# Patient Record
Sex: MALE | Race: WHITE | NOT HISPANIC OR LATINO | Employment: FULL TIME | URBAN - METROPOLITAN AREA
[De-identification: names, ages, dates, MRNs, and addresses within clinical notes are randomized per-mention and may not be internally consistent; named-entity substitution may affect disease eponyms.]

---

## 2020-08-17 RX ORDER — ARGININE HCL 1000 MG
1 TABLET ORAL DAILY
COMMUNITY

## 2020-08-17 RX ORDER — ALFUZOSIN HYDROCHLORIDE 10 MG/1
10 TABLET, EXTENDED RELEASE ORAL DAILY
COMMUNITY

## 2020-08-17 RX ORDER — METOPROLOL SUCCINATE 50 MG/1
50 TABLET, EXTENDED RELEASE ORAL DAILY
COMMUNITY
Start: 2020-03-16

## 2020-08-17 RX ORDER — LORATADINE 10 MG/1
10 TABLET ORAL DAILY
COMMUNITY

## 2020-08-17 RX ORDER — TADALAFIL 5 MG/1
5 TABLET ORAL DAILY
COMMUNITY

## 2020-08-17 RX ORDER — CHOLECALCIFEROL (VITAMIN D3) 50 MCG
1 CAPSULE ORAL DAILY
Status: ON HOLD | COMMUNITY
End: 2020-08-20 | Stop reason: ALTCHOICE

## 2020-08-17 RX ORDER — LISINOPRIL 20 MG/1
20 TABLET ORAL DAILY
COMMUNITY
Start: 2020-07-29

## 2020-08-17 RX ORDER — OMEPRAZOLE 20 MG/1
20 TABLET, DELAYED RELEASE ORAL DAILY
COMMUNITY

## 2020-08-17 RX ORDER — AMLODIPINE BESYLATE 10 MG/1
10 TABLET ORAL DAILY
COMMUNITY
Start: 2020-07-29

## 2020-08-17 NOTE — PRE-PROCEDURE INSTRUCTIONS
Pre-Surgery Instructions:   Medication Instructions    alfuzosin (UROXATRAL) 10 mg 24 hr tablet Instructed patient per Anesthesia Guidelines   amLODIPine (NORVASC) 10 mg tablet Instructed patient per Anesthesia Guidelines   Cholecalciferol (Vitamin D3 Super Strength) 50 MCG (2000 UT) CAPS Instructed patient per Anesthesia Guidelines   L-Arginine 1000 MG TABS Instructed patient per Anesthesia Guidelines   lisinopril (ZESTRIL) 20 mg tablet Instructed patient per Anesthesia Guidelines   loratadine (CLARITIN) 10 mg tablet Instructed patient per Anesthesia Guidelines   metFORMIN (GLUCOPHAGE) 1000 MG tablet Instructed patient per Anesthesia Guidelines   metoprolol succinate (TOPROL-XL) 50 mg 24 hr tablet Instructed patient per Anesthesia Guidelines   Milk Thistle 1000 MG CAPS Instructed patient per Anesthesia Guidelines   omeprazole (PriLOSEC OTC) 20 MG tablet Instructed patient per Anesthesia Guidelines   tadalafil (CIALIS) 5 MG tablet Instructed patient per Anesthesia Guidelines  Instructed to take Amlodipine, Prilosec,and Metoprolol with sip of water the morning of surgery  Instructed on 8/14 no aspirin, NSAIDs vitamins or supplements from 8/14 til after  surgery

## 2020-08-19 ENCOUNTER — ANESTHESIA EVENT (OUTPATIENT)
Dept: PERIOP | Facility: HOSPITAL | Age: 55
End: 2020-08-19
Payer: COMMERCIAL

## 2020-08-20 ENCOUNTER — ANESTHESIA (OUTPATIENT)
Dept: PERIOP | Facility: HOSPITAL | Age: 55
End: 2020-08-20
Payer: COMMERCIAL

## 2020-08-20 ENCOUNTER — HOSPITAL ENCOUNTER (OUTPATIENT)
Facility: HOSPITAL | Age: 55
Setting detail: OUTPATIENT SURGERY
Discharge: HOME | End: 2020-08-20
Attending: UROLOGY | Admitting: UROLOGY
Payer: COMMERCIAL

## 2020-08-20 VITALS
OXYGEN SATURATION: 95 % | SYSTOLIC BLOOD PRESSURE: 144 MMHG | DIASTOLIC BLOOD PRESSURE: 81 MMHG | TEMPERATURE: 98.2 F | RESPIRATION RATE: 18 BRPM | WEIGHT: 255 LBS | HEART RATE: 79 BPM | HEIGHT: 74 IN | BODY MASS INDEX: 32.73 KG/M2

## 2020-08-20 DIAGNOSIS — N13.8 ENLARGED PROSTATE WITH URINARY OBSTRUCTION: Primary | ICD-10-CM

## 2020-08-20 DIAGNOSIS — N40.1 ENLARGED PROSTATE WITH URINARY OBSTRUCTION: Primary | ICD-10-CM

## 2020-08-20 PROBLEM — I10 HTN (HYPERTENSION): Status: ACTIVE | Noted: 2020-08-20

## 2020-08-20 PROBLEM — F17.200 SMOKING: Status: ACTIVE | Noted: 2020-08-20

## 2020-08-20 PROBLEM — K21.9 GASTROESOPHAGEAL REFLUX DISEASE WITHOUT ESOPHAGITIS: Status: ACTIVE | Noted: 2020-08-20

## 2020-08-20 PROBLEM — E78.5 HYPERLIPIDEMIA: Status: ACTIVE | Noted: 2020-08-20

## 2020-08-20 LAB — GLUCOSE SERPL-MCNC: 119 MG/DL (ref 65–140)

## 2020-08-20 PROCEDURE — 82948 REAGENT STRIP/BLOOD GLUCOSE: CPT

## 2020-08-20 PROCEDURE — L8699 PROSTHETIC IMPLANT NOS: HCPCS | Performed by: UROLOGY

## 2020-08-20 DEVICE — IMPLANT URO PROSTATE UROLIFT: Type: IMPLANTABLE DEVICE | Site: BLADDER | Status: FUNCTIONAL

## 2020-08-20 RX ORDER — MIDAZOLAM HYDROCHLORIDE 2 MG/2ML
INJECTION, SOLUTION INTRAMUSCULAR; INTRAVENOUS AS NEEDED
Status: DISCONTINUED | OUTPATIENT
Start: 2020-08-20 | End: 2020-08-20

## 2020-08-20 RX ORDER — PROPOFOL 10 MG/ML
INJECTION, EMULSION INTRAVENOUS AS NEEDED
Status: DISCONTINUED | OUTPATIENT
Start: 2020-08-20 | End: 2020-08-20

## 2020-08-20 RX ORDER — ACETAMINOPHEN 325 MG/1
650 TABLET ORAL EVERY 6 HOURS PRN
Status: DISCONTINUED | OUTPATIENT
Start: 2020-08-20 | End: 2020-08-20 | Stop reason: HOSPADM

## 2020-08-20 RX ORDER — FENTANYL CITRATE 50 UG/ML
INJECTION, SOLUTION INTRAMUSCULAR; INTRAVENOUS AS NEEDED
Status: DISCONTINUED | OUTPATIENT
Start: 2020-08-20 | End: 2020-08-20

## 2020-08-20 RX ORDER — DEXAMETHASONE SODIUM PHOSPHATE 4 MG/ML
INJECTION, SOLUTION INTRA-ARTICULAR; INTRALESIONAL; INTRAMUSCULAR; INTRAVENOUS; SOFT TISSUE AS NEEDED
Status: DISCONTINUED | OUTPATIENT
Start: 2020-08-20 | End: 2020-08-20

## 2020-08-20 RX ORDER — CIPROFLOXACIN 2 MG/ML
400 INJECTION, SOLUTION INTRAVENOUS ONCE
Status: COMPLETED | OUTPATIENT
Start: 2020-08-20 | End: 2020-08-20

## 2020-08-20 RX ORDER — CIPROFLOXACIN 500 MG/1
500 TABLET, FILM COATED ORAL EVERY 12 HOURS SCHEDULED
Qty: 14 TABLET | Refills: 0 | Status: SHIPPED | OUTPATIENT
Start: 2020-08-20 | End: 2020-08-27

## 2020-08-20 RX ORDER — SODIUM CHLORIDE 9 MG/ML
125 INJECTION, SOLUTION INTRAVENOUS CONTINUOUS
Status: DISCONTINUED | OUTPATIENT
Start: 2020-08-20 | End: 2020-08-20 | Stop reason: HOSPADM

## 2020-08-20 RX ORDER — PHENAZOPYRIDINE HYDROCHLORIDE 200 MG/1
200 TABLET, FILM COATED ORAL ONCE
Status: COMPLETED | OUTPATIENT
Start: 2020-08-20 | End: 2020-08-20

## 2020-08-20 RX ORDER — CHLORAL HYDRATE 500 MG
1000 CAPSULE ORAL DAILY
COMMUNITY

## 2020-08-20 RX ORDER — SODIUM CHLORIDE 9 MG/ML
150 INJECTION, SOLUTION INTRAVENOUS CONTINUOUS
Status: DISCONTINUED | OUTPATIENT
Start: 2020-08-20 | End: 2020-08-20 | Stop reason: HOSPADM

## 2020-08-20 RX ORDER — CIPROFLOXACIN 500 MG/1
500 TABLET, FILM COATED ORAL ONCE
Status: DISCONTINUED | OUTPATIENT
Start: 2020-08-20 | End: 2020-08-20 | Stop reason: HOSPADM

## 2020-08-20 RX ORDER — ONDANSETRON 2 MG/ML
4 INJECTION INTRAMUSCULAR; INTRAVENOUS ONCE AS NEEDED
Status: DISCONTINUED | OUTPATIENT
Start: 2020-08-20 | End: 2020-08-20 | Stop reason: HOSPADM

## 2020-08-20 RX ORDER — OXYCODONE HYDROCHLORIDE AND ACETAMINOPHEN 5; 325 MG/1; MG/1
1 TABLET ORAL EVERY 4 HOURS PRN
Status: DISCONTINUED | OUTPATIENT
Start: 2020-08-20 | End: 2020-08-20 | Stop reason: HOSPADM

## 2020-08-20 RX ORDER — ONDANSETRON 2 MG/ML
INJECTION INTRAMUSCULAR; INTRAVENOUS AS NEEDED
Status: DISCONTINUED | OUTPATIENT
Start: 2020-08-20 | End: 2020-08-20

## 2020-08-20 RX ORDER — FENTANYL CITRATE/PF 50 MCG/ML
25 SYRINGE (ML) INJECTION
Status: COMPLETED | OUTPATIENT
Start: 2020-08-20 | End: 2020-08-20

## 2020-08-20 RX ADMIN — SODIUM CHLORIDE 150 ML/HR: 0.9 INJECTION, SOLUTION INTRAVENOUS at 10:37

## 2020-08-20 RX ADMIN — PHENAZOPYRIDINE 200 MG: 200 TABLET ORAL at 13:03

## 2020-08-20 RX ADMIN — SODIUM CHLORIDE: 0.9 INJECTION, SOLUTION INTRAVENOUS at 11:36

## 2020-08-20 RX ADMIN — CIPROFLOXACIN: 2 INJECTION INTRAVENOUS at 10:56

## 2020-08-20 RX ADMIN — DEXAMETHASONE SODIUM PHOSPHATE 4 MG: 4 INJECTION, SOLUTION INTRAMUSCULAR; INTRAVENOUS at 11:06

## 2020-08-20 RX ADMIN — PROPOFOL 200 MG: 10 INJECTION, EMULSION INTRAVENOUS at 11:06

## 2020-08-20 RX ADMIN — FENTANYL CITRATE 100 MCG: 50 INJECTION, SOLUTION INTRAMUSCULAR; INTRAVENOUS at 11:06

## 2020-08-20 RX ADMIN — LIDOCAINE HYDROCHLORIDE 100 MG: 20 INJECTION, SOLUTION INTRAVENOUS at 11:06

## 2020-08-20 RX ADMIN — MIDAZOLAM 2 MG: 1 INJECTION INTRAMUSCULAR; INTRAVENOUS at 11:01

## 2020-08-20 RX ADMIN — FENTANYL CITRATE 25 MCG: 50 INJECTION, SOLUTION INTRAMUSCULAR; INTRAVENOUS at 11:57

## 2020-08-20 RX ADMIN — FENTANYL CITRATE 25 MCG: 50 INJECTION, SOLUTION INTRAMUSCULAR; INTRAVENOUS at 12:23

## 2020-08-20 RX ADMIN — FENTANYL CITRATE 25 MCG: 50 INJECTION, SOLUTION INTRAMUSCULAR; INTRAVENOUS at 12:15

## 2020-08-20 RX ADMIN — ONDANSETRON 4 MG: 2 INJECTION INTRAMUSCULAR; INTRAVENOUS at 11:06

## 2020-08-20 RX ADMIN — FENTANYL CITRATE 25 MCG: 50 INJECTION, SOLUTION INTRAMUSCULAR; INTRAVENOUS at 12:02

## 2020-08-20 NOTE — ANESTHESIA PREPROCEDURE EVALUATION
Procedure:  CYSTOSCOPY WITH INSERTION UROLIFT (N/A Bladder)    Relevant Problems   ANESTHESIA (within normal limits)      CARDIO  h/o PVC s    (+) HTN (hypertension)   (+) Hyperlipidemia      ENDO (within normal limits)      GI/HEPATIC  fatty liver   (+) Gastroesophageal reflux disease without esophagitis (controlled )      /RENAL (within normal limits)      HEMATOLOGY (within normal limits)      MUSCULOSKELETAL (within normal limits)      NEURO/PSYCH (within normal limits)      PULMONARY   (+) Smoking        Physical Exam    Airway    Mallampati score: II  TM Distance: >3 FB  Neck ROM: full     Dental   No notable dental hx     Cardiovascular  Rhythm: regular, Rate: normal, Cardiovascular exam normal    Pulmonary  Pulmonary exam normal Breath sounds clear to auscultation,     Other Findings        Anesthesia Plan  ASA Score- 2     Anesthesia Type- general with ASA Monitors  Additional Monitors:   Airway Plan: LMA  Plan Factors-    Chart reviewed  Patient is not a current smoker  Patient smoked on day of surgery  Induction- intravenous  Postoperative Plan-     Informed Consent- Anesthetic plan and risks discussed with patient

## 2020-08-20 NOTE — INTERVAL H&P NOTE
H&P reviewed  After examining the patient I find no changes in the patients condition since the H&P had been written      Vitals:    08/20/20 1040   BP: 146/95   Pulse: 84   Resp: 18   Temp: 98 1 °F (36 7 °C)   SpO2: 94%

## 2020-08-20 NOTE — OP NOTE
OPERATIVE REPORT    PATIENT NAME: Mylene Arora    :  1965  MRN: [id number]  Pt Location: [facility] OR ROOM 02    SURGERY DATE:   2020  Surgeon(s) and Role:      Demetria Seek, DO - Primary    Preop Diagnosis:  Benign prostatic hyperplasia with lower urinary tract symptoms [N40 1]  Post-Op Diagnosis Codes:   Benign prostatic hyperplasia with lower urinary tract symptoms [N40 1]  Procedure(s):  CYSTOSCOPY WITH INSERTION UROLIFT, (4 implants were placed in total)    Specimen(s):  None    Estimated Blood Loss:   Minimal    Drains:  Urethral Catheter Latex 20 Fr  (Active)   Number of days: 0       Anesthesia Type:   General    Operative Indications:  Multiple obstructive and irritative urinary symptoms refractory to treatment with alpha blockers and PDE 5 inhibitors    Operative Findings:  Enlarged obstructing lateral lobes of the prostate  Trabeculated urinary bladder    Complications:   None known    Procedure and Technique:  Patient was identified  General anesthesia was administered  Patient was placed in dorsal lithotomy position  Genitals were prepped and draped  Time-out was taken  Rigid cystoscopy was performed and revealed trabeculated bladder with thickened bladder wall  Cystoscopy also revealed bilateral lateral lobe enlargement of the prostate causing bladder outlet obstruction   was removed from the cystoscope and replaced with a Urolift implant delivery device    First implant was placed 2 cm distal to the bladder neck on the left  Second implant was placed 2 cm distal to the bladder neck on the right  Third implant was placed at the level of the verumontanum on the left  Fourth and final implant was placed at the level of the verumontanum on the right  Visualization of the prostatic urethra was then done and revealed an excellent continuous anterior prostatic urethral channel to allow for unobstructed voiding  No significant bleeding was noted  Procedure was terminated at this point in time  [de-identified] [facility] Croft catheter was placed to gravity drainage and attached to a leg bag  Patient went to recovery room in good postoperative condition having tolerated the procedure well  His to follow-up at the office tomorrow for Croft catheter removal       I was present for the entire procedure    Patient Disposition:  PACU     SIGNATURE: Valentino Gold,   DATE: August 20, 2020  TIME: 11:52 AM

## 2020-08-20 NOTE — ANESTHESIA POSTPROCEDURE EVALUATION
Post-Op Assessment Note    CV Status:  Stable  Pain Score: 1    Pain management: adequate     Mental Status:  Alert and awake   Hydration Status:  Euvolemic   PONV Controlled:  Controlled   Airway Patency:  Patent      Post Op Vitals Reviewed: Yes      Staff: Anesthesiologist         No complications documented      /82 (08/20/20 1230)    Temp 98 2 °F (36 8 °C) (08/20/20 1230)    Pulse 78 (08/20/20 1230)   Resp 14 (08/20/20 1230)    SpO2 93 % (08/20/20 1230)

## 2020-08-20 NOTE — DISCHARGE INSTRUCTIONS
Urinary Leg Bag   WHAT YOU NEED TO KNOW:   What is a urinary leg bag? A urinary leg bag holds urine that drains from your catheter  It fits under your clothes and allows you to do your normal daily activities  How do I use a urinary leg bag? · Wash your hands  before and after you touch your catheter, tubing, or drainage bag  Use soap and water  This reduces the risk of infection  · Strap your leg bag to your thigh or calf  Make sure the straps are comfortable  The straps can cause problems with blood flow in your leg if they are too tight  · Clean the tip of the drainage tube with alcohol  before attaching it to your catheter  This helps prevent bacteria from getting into your catheter  · The connecting tube should not pull on your catheter  Skin breakdown can occur if there is constant pulling on the catheter  · Check the tube often to make sure it is not kinked or twisted  Blockage in the tube can cause urine to back up into your bladder  Your urine must flow straight through the tube into your leg bag  · Always keep the leg bag below your bladder  This prevents urine from the bag going back into your bladder, which may cause an infection  · Empty your leg bag when it is ½ full, or every 3 hours  A full bag may break or disconnect from the catheter  · Change to your bedside bag before you go to bed  Your bedside bag can hold more urine  Do not use your leg bag at night because it could become too full or break  · Clean your leg bag after every use  Fill the bag with 2 parts vinegar and 3 parts water  Let it soak for 20 minutes, then rinse and let dry  Follow your healthcare provider's instruction on replacing your leg bag with a new one  CARE AGREEMENT:   You have the right to help plan your care  Learn about your health condition and how it may be treated  Discuss treatment options with your caregivers to decide what care you want to receive   You always have the right to refuse treatment  The above information is an  only  It is not intended as medical advice for individual conditions or treatments  Talk to your doctor, nurse or pharmacist before following any medical regimen to see if it is safe and effective for you  © 2017 [employer] Information is for End User's use only and may not be sold, redistributed or otherwise used for commercial purposes  All illustrations and images included in CareNotes® are the copyrighted property of A [employer]  or [employer]  Croft Catheter Placement and Care   WHAT YOU NEED TO KNOW:   A Croft catheter is a sterile tube that is inserted into your bladder to drain urine  It is also called an indwelling urinary catheter  The tip of the catheter has a small balloon filled with solution that holds the catheter inside your bladder  DISCHARGE INSTRUCTIONS:   Seek care immediately if:   · Your catheter comes out  · You suddenly have material that looks like sand in the tubing or drainage bag  · No urine is draining into the bag and you have checked the system  · You have pain in your hip, back, pelvis, or lower abdomen  · You are confused or cannot think clearly  Contact your healthcare provider if:   · You have a fever  · You have bladder spasms for more than 1 day after the catheter is placed  · You see blood in the tubing or drainage bag  · You have a rash or itching where the catheter tube is secured to your skin  · Urine leaks from or around the catheter, tubing, or drainage bag  · The closed drainage system has accidently come open or apart  · You see a layer of crystals inside the tubing  · You have questions or concerns about your condition or care  Care for your Croft catheter:   · Clean your genital area 2 times every day  Clean your catheter and the area around where it was inserted  Use soap and water   Clean your anal opening and catheter area after every bowel movement  · Secure the catheter tube  so you do not pull or move the catheter  This helps prevent pain and bladder spasms  Healthcare providers will show you how to use medical tape or a strap to secure the catheter tube to your body  · Keep a closed drainage system  Your Croft catheter should always be attached to the drainage bag to form a closed system  Do not disconnect any part of the closed system unless you need to change the bag  Care for your drainage bag:   · Ask if a leg bag is right for you  A leg bag can be worn under your clothes  Ask your healthcare provider for more information about a leg bag  · Keep the drainage bag below the level of your waist   This helps stop urine from moving back up the tubing and into your bladder  Do not loop or kink the tubing  This can cause urine to back up and collect in your bladder  Do not let the drainage bag touch or lie on the floor  · Empty the drainage bag when needed  The weight of a full drainage bag can be painful  Empty the drainage bag every 3 to 6 hours or when it is ? full  · Clean and change the drainage bag as directed  Ask your healthcare provider how often you should change the drainage bag and what cleaning solution to use  Wear disposable gloves when you change the bag  Do not allow the end of the catheter or tubing to touch anything  Clean the ends with an alcohol pad before you reconnect them  What to do if problems develop:   · No urine is draining into the bag:      ¨ Check for kinks in the tubing and straighten them out  ¨ Check the tape or strap used to secure the catheter tube to your skin  Make sure it is not blocking the tube  ¨ Make sure you are not sitting or lying on the tubing      ¨ Make sure the urine bag is hanging below the level of your waist     · Urine leaks from or around the catheter, tubing, or drainage bag:  Check if the closed drainage system has accidently come open or apart  Clean the catheter and tubing ends with a new alcohol pad and reconnect them  Prevent an infection:   · Wash your hands often  Wash before and after you touch your catheter, tubing, or drainage bag  Use soap and water  Wear clean disposable gloves when you care for your catheter or disconnect the drainage bag  Wash your hands before you prepare or eat food  · Drink liquids as directed  Ask your healthcare provider how much liquid to drink each day and which liquids are best for you  Liquids will help flush your kidneys and bladder to help prevent infection  Follow up with your healthcare provider as directed:  Write down your questions so you remember to ask them during your visits  © 2017 [facility] [facility] Information is for End User's use only and may not be sold, redistributed or otherwise used for commercial purposes  All illustrations and images included in CareNotes® are the copyrighted property of [employer]  or [employer]  The above information is an  only  It is not intended as medical advice for individual conditions or treatments  Talk to your doctor, nurse or pharmacist before following any medical regimen to see if it is safe and effective for you

## 2021-01-05 ENCOUNTER — IMMUNIZATIONS (OUTPATIENT)
Dept: FAMILY MEDICINE CLINIC | Facility: HOSPITAL | Age: 56
End: 2021-01-05

## 2021-01-05 DIAGNOSIS — Z23 ENCOUNTER FOR IMMUNIZATION: ICD-10-CM

## 2021-01-05 PROCEDURE — 91301 SARS-COV-2 / COVID-19 MRNA VACCINE (MODERNA) 100 MCG: CPT

## 2021-01-05 PROCEDURE — 0011A SARS-COV-2 / COVID-19 MRNA VACCINE (MODERNA) 100 MCG: CPT

## 2021-02-08 ENCOUNTER — IMMUNIZATIONS (OUTPATIENT)
Dept: FAMILY MEDICINE CLINIC | Facility: HOSPITAL | Age: 56
End: 2021-02-08

## 2021-02-08 DIAGNOSIS — Z23 ENCOUNTER FOR IMMUNIZATION: Primary | ICD-10-CM

## 2021-02-08 PROCEDURE — 91301 SARS-COV-2 / COVID-19 MRNA VACCINE (MODERNA) 100 MCG: CPT

## 2021-02-08 PROCEDURE — 0012A SARS-COV-2 / COVID-19 MRNA VACCINE (MODERNA) 100 MCG: CPT

## 2022-06-01 ENCOUNTER — EVALUATION (OUTPATIENT)
Dept: OCCUPATIONAL THERAPY | Facility: CLINIC | Age: 57
End: 2022-06-01
Payer: OTHER MISCELLANEOUS

## 2022-06-01 DIAGNOSIS — M25.531 RIGHT WRIST PAIN: Primary | ICD-10-CM

## 2022-06-01 PROCEDURE — 97165 OT EVAL LOW COMPLEX 30 MIN: CPT

## 2022-06-01 NOTE — PROGRESS NOTES
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  Patient symptoms including wrist pain during movement, decreased ROM, and decreased fine motor control  Patient symptoms began on 2022  Patient initially went to walk in clinic on 2022  Patient had x-rays a few days following injury and an MRI on 2022  X-ray was abnormal and displayed a bone spur  Patient is awaiting MRI results  Patient has a follow up visit on 2022  Patient was provided a pre-fabricated splint by physician and educated on wear schedule  Patient reports compliance to wear schedule  Patient is ambidextrous  Patient currently is employed as a  and with [employer]  Patient is unable to work currently due to injury  Patient is having difficulty completing any task that involves grasping and pinching such as using a zipper and buttoning     Impairments: abnormal coordination, abnormal or restricted ROM, activity intolerance, impaired physical strength and pain with function  Understanding of Dx/Px/POC: good   Prognosis: good    Goals  STGs:    Patient will increase ROM in wrist by 10-20 degrees- Not Met    Patient will report decreased wrist pain during functional movement- Not Met    Patient will demonstrate an understanding of HEP by end of session- Met    LTGs:    Patient will display wrist ROM WFL- Not Met    Patient will report resolution of pain symptoms during functional movement- Not Met    Patient will report ability to perform all ADLs without symptoms- Not Met    Patient will return to work and be able to perform all work related tasks- Not Met    Plan  Plan details: Patient presenting to OP OT services due to a diagnosis of R wrist pain  Patient symptoms include wrist pain, decreased ROM, and decreased fine motor control  Patient would benefit from skilled occupational therapy services 2 times per week for 4 weeks to return to prior level of function and achieve all of their established goals  Thank you for the referral    Patient would benefit from: custom splinting, skilled occupational therapy and OT eval  Referral necessary: Yes  Planned modality interventions: ultrasound, thermotherapy: hydrocollator packs, unattended electrical stimulation and TENS  Planned therapy interventions: IADL retraining, patient education, self care, manual therapy, orthotic fitting/training, strengthening, stretching, therapeutic activities, therapeutic exercise, home exercise program, graded exercise, graded activity, functional ROM exercises and fine motor coordination training  Frequency: 2x week  Duration in visits: 8  Duration in weeks: 4  Treatment plan discussed with: patient        Subjective Evaluation    History of Present Illness  Date of onset: 5/3/2022  Mechanism of injury: trauma  Mechanism of injury: 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          Recurrent probem    Quality of life: good    Pain  Current pain ratin  At best pain ratin  At worst pain ratin  Location: R wrist  Quality: knife-like, sharp and tight  Relieving factors: ice and relaxation  Aggravating factors: lifting  Progression: no change    Social Support    Employment status: working  Hand dominance: ambidextrous      Diagnostic Tests  X-ray: abnormal  Abnormal MRI: Waiting results  FCE comments: Patient had an x-ray that displayed a bone spur  Patient had an MRI that is awaiting results   Treatments  Previous treatment: immobilization  Current treatment: occupational therapy  Patient Goals  Patient goals for therapy: decreased edema, decreased pain, increased motion, increased strength, independence with ADLs/IADLs and return to work          Objective     Active Range of Motion     Left Wrist   Normal active range of motion    Right Wrist   Wrist flexion: 115 degrees   Wrist extension: 62 degrees   Radial deviation: 20 degrees with pain  Ulnar deviation: 45 degrees     Additional Active Range of Motion Details  Patient displays pain during radial deviation and flexion  Strength/Myotome Testing     Left Wrist/Hand      (2nd hand position)     Trial 1: 100    Thumb Strength  Key/Lateral Pinch     Trial 1: 30  Tip/Two-Point Pinch     Trial 1: 20  Palmar/Three-Point Pinch     Trial 1: 19    Right Wrist/Hand      (2nd hand position)     Trial 1: 100    Thumb Strength   Key/Lateral Pinch     Trial 1: 34  Tip/Two-Point Pinch     Trial 1: 22  Palmar/Three-Point Pinch     Trial 1: 22    Additional Strength Details  Patient didn't experience pain during  strength assessments  Tests     Right Wrist/Hand   Positive Santo's  Additional Tests Details  Patient displayed positive symptoms during Santo's test   Patient had a negative extensor synergy test     Swelling     Left Wrist/Hand   Circumference wrist: 20 cm    Right Wrist/Hand   Circumference wrist: 19 cm  Neuro Exam:     Functional outcomes   Left 9 peg hole test: 19 51 (seconds)  Right 9 hole peg test: 19 53 (seconds)    Patient provided HEP and educated how to properly complete  Patient receptive and agreeable to education  Patient presented with a pre-fabricated splint provided by physician  Patient reported understanding and compliance to wear schedule  Patient educated on ice/heat to decrease symptoms           Initial Evaluation completed on: 6/01/2022  Re-Evaluation needs to be completed before: 7/01/2022  Insurance: A  FOTO: 6/01/2022  Auth Visits: N/A       Precautions: R wrist pain       Manuals 6/01/2022       IASTM        Manual Stretch Wrist all planes Neuro Re-Ed  6/01/2022                                                               Ther Ex 6/01/2022       Wrist AROM Extension Radial/Ulnar Deviation HEP       Dart Throw ROM HEP       Thumb AROM all planes HEP       Rubber Band Digit Extension        Digiflex        Hand Power Pro        Single Tension Pin Thumb                        Ther Activity 6/01/2022                                               Modalities 6/01/2022        10'       Ultrasound Performed dorsal wrist

## 2022-06-01 NOTE — LETTER
Pat 10, 2022    [address]  [address] [address]  [address] [facility] [address]    Patient: nEzo Wilkins   YOB: 1965   Date of Visit: 2022     Encounter Diagnosis     ICD-10-CM    1  Right wrist pain  M25 531        Dear Dr Luis Alberto Benjamin: Thank you for your recent referral of Enzo Wilkins  Please review the attached evaluation summary from Popeye's recent visit  Please verify that you agree with the plan of care by signing the attached order  If you have any questions or concerns, please do not hesitate to call  I sincerely appreciate the opportunity to share in the care of one of your patients and hope to have another opportunity to work with you in the near future  Sincerely,    Radha Nunez      Referring Provider:     I certify that I have read the below Plan of Care and certify the need for these services furnished under this plan of treatment while under my care  Ginna Azar MD  [address]  [address] [address]  [address] [id number]  Via Fax: [phone]        OT Evaluation     Today's date: 2022  Patient name: Enzo Wilkins  : 1965  MRN: [id number]  Referring provider: Gris Ortega MD  Dx:   Encounter Diagnosis     ICD-10-CM    1  Right wrist pain  M25 531        Start Time: 0800  Stop Time: 0900  Total time in clinic (min): 60 minutes    Assessment  Assessment details: Patient presents with a diagnosis of R wrist pain  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  Patient symptoms including wrist pain during movement, decreased ROM, and decreased fine motor control  Patient symptoms began on 2022  Patient initially went to walk in clinic on 2022  Patient had x-rays a few days following injury and an MRI on 2022  X-ray was abnormal and displayed a bone spur  Patient is awaiting MRI results  Patient has a follow up visit on 2022   Patient was provided a pre-fabricated splint by physician and educated on wear schedule  Patient reports compliance to wear schedule  Patient is ambidextrous  Patient currently is employed as a  and with [employer]  Patient is unable to work currently due to injury  Patient is having difficulty completing any task that involves grasping and pinching such as using a zipper and buttoning  Impairments: abnormal coordination, abnormal or restricted ROM, activity intolerance, impaired physical strength and pain with function  Understanding of Dx/Px/POC: good   Prognosis: good    Goals  STGs:    Patient will increase ROM in wrist by 10-20 degrees- Not Met    Patient will report decreased wrist pain during functional movement- Not Met    Patient will demonstrate an understanding of HEP by end of session- Met    LTGs:    Patient will display wrist ROM WFL- Not Met    Patient will report resolution of pain symptoms during functional movement- Not Met    Patient will report ability to perform all ADLs without symptoms- Not Met    Patient will return to work and be able to perform all work related tasks- Not Met    Plan  Plan details: Patient presenting to OP OT services due to a diagnosis of R wrist pain  Patient symptoms include wrist pain, decreased ROM, and decreased fine motor control  Patient would benefit from skilled occupational therapy services 2 times per week for 4 weeks to return to prior level of function and achieve all of their established goals   Thank you for the referral    Patient would benefit from: custom splinting, skilled occupational therapy and OT eval  Referral necessary: Yes  Planned modality interventions: ultrasound, thermotherapy: hydrocollator packs, unattended electrical stimulation and TENS  Planned therapy interventions: IADL retraining, patient education, self care, manual therapy, orthotic fitting/training, strengthening, stretching, therapeutic activities, therapeutic exercise, home exercise program, graded exercise, graded activity, functional ROM exercises and fine motor coordination training  Frequency: 2x week  Duration in visits: 8  Duration in weeks: 4  Treatment plan discussed with: patient        Subjective Evaluation    History of Present Illness  Date of onset: 5/3/2022  Mechanism of injury: trauma  Mechanism of injury: 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          Recurrent probem    Quality of life: good    Pain  Current pain ratin  At best pain ratin  At worst pain ratin  Location: R wrist  Quality: knife-like, sharp and tight  Relieving factors: ice and relaxation  Aggravating factors: lifting  Progression: no change    Social Support    Employment status: working  Hand dominance: ambidextrous      Diagnostic Tests  X-ray: abnormal  Abnormal MRI: Waiting results  FCE comments: Patient had an x-ray that displayed a bone spur  Patient had an MRI that is awaiting results  Treatments  Previous treatment: immobilization  Current treatment: occupational therapy  Patient Goals  Patient goals for therapy: decreased edema, decreased pain, increased motion, increased strength, independence with ADLs/IADLs and return to work          Objective     Active Range of Motion     Left Wrist   Normal active range of motion    Right Wrist   Wrist flexion: 115 degrees   Wrist extension: 62 degrees   Radial deviation: 20 degrees with pain  Ulnar deviation: 45 degrees     Additional Active Range of Motion Details  Patient displays pain during radial deviation and flexion      Strength/Myotome Testing     Left Wrist/Hand      (2nd hand position)     Trial 1: 100    Thumb Strength  Key/Lateral Pinch     Trial 1: 30  Tip/Two-Point Pinch     Trial 1: 20  Palmar/Three-Point Pinch     Trial 1: 19    Right Wrist/Hand      (2nd hand position)     Trial 1: 100    Thumb Strength   Key/Lateral Pinch     Trial 1: 34  Tip/Two-Point Pinch     Trial 1: 22  Palmar/Three-Point Pinch     Trial 1: 22    Additional Strength Details  Patient didn't experience pain during  strength assessments  Tests     Right Wrist/Hand   Positive Santo's  Additional Tests Details  Patient displayed positive symptoms during Santo's test   Patient had a negative extensor synergy test     Swelling     Left Wrist/Hand   Circumference wrist: 20 cm    Right Wrist/Hand   Circumference wrist: 19 cm  Neuro Exam:     Functional outcomes   Left 9 peg hole test: 19 51 (seconds)  Right 9 hole peg test: 19 53 (seconds)    Patient provided HEP and educated how to properly complete  Patient receptive and agreeable to education  Patient presented with a pre-fabricated splint provided by physician  Patient reported understanding and compliance to wear schedule  Patient educated on ice/heat to decrease symptoms  Initial Evaluation completed on: 6/01/2022  Re-Evaluation needs to be completed before: 7/01/2022  Insurance: AMA  FOTO: 6/01/2022  Auth Visits: N/A       Precautions: R wrist pain       Manuals 6/01/2022       IASTM        Manual Stretch Wrist all planes                        Neuro Re-Ed  6/01/2022                                                               Ther Ex 6/01/2022       Wrist AROM Extension Radial/Ulnar Deviation HEP       Dart Throw ROM HEP       Thumb AROM all planes HEP       Rubber Band Digit Extension        Digiflex        Hand Power Pro        Single Tension Pin Thumb                        Ther Activity 6/01/2022                                               Modalities 6/01/2022        10'       Ultrasound Performed dorsal wrist                 Attestation signed by Rusty Powell OT at 6/1/2022 11:45 AM:  I supervised the visit  We discussed the case to ensure appropriate continuation and progression of care and I reviewed the documentation

## 2022-06-06 NOTE — PROGRESS NOTES
Daily Note     Today's date: 2022  Patient name: Jed Lama  : 1965  MRN: [id number]  Referring provider: Kayla Edwards MD  Dx:   Encounter Diagnosis     ICD-10-CM    1  Right wrist pain  M25 531                   Subjective: I tried opening a bottle for my daughter and I got a lot of pain  Objective: See treatment diary below      Assessment: Tolerated treatment well  Patient treatment primarily focused on manual massage and stretching, as well as modalities  Pain primarily around ulnar styloid, radiating across dorsal wrist crease  Trialed KT tape for pain and swelling  Pt verbalized being unable to weight bear through R wrist secondary increase pain with same  Pain depends upon certain movement of what he is doing  Plan: Continue per plan of care  Progress treatment as tolerated  Initial Evaluation completed on: 2022  Re-Evaluation needs to be completed before: 2022  Insurance: [place]  FOTO: 2022  Auth Visits: N/A       Precautions: R wrist pain       Manuals 2022      IASTM        Manual Stretch Wrist all planes  performed      KT tape  Ulnar side      Manual massage  performed      Neuro Re-Ed  2022                                                              Ther Ex 2022      Wrist AROM Extension Radial/Ulnar Deviation HEP       Dart Throw ROM HEP       Thumb AROM all planes HEP       Rubber Band Digit Extension        Digiflex        Hand Power Pro        Single Tension Pin Thumb        isometrics  X 5 all wrist directions              Ther Activity 2022                                              Modalities 2022      MH 10' CP/stim performed post tx      Ultrasound Performed dorsal wrist 0 8 intensity  3  3MHz  50% rate

## 2022-06-07 ENCOUNTER — OFFICE VISIT (OUTPATIENT)
Dept: OCCUPATIONAL THERAPY | Facility: CLINIC | Age: 57
End: 2022-06-07
Payer: OTHER MISCELLANEOUS

## 2022-06-07 DIAGNOSIS — M25.531 RIGHT WRIST PAIN: Primary | ICD-10-CM

## 2022-06-07 PROCEDURE — 97140 MANUAL THERAPY 1/> REGIONS: CPT

## 2022-06-07 PROCEDURE — 97014 ELECTRIC STIMULATION THERAPY: CPT

## 2022-06-07 PROCEDURE — 97035 APP MDLTY 1+ULTRASOUND EA 15: CPT

## 2022-06-09 ENCOUNTER — OFFICE VISIT (OUTPATIENT)
Dept: OCCUPATIONAL THERAPY | Facility: CLINIC | Age: 57
End: 2022-06-09
Payer: OTHER MISCELLANEOUS

## 2022-06-09 DIAGNOSIS — M25.531 RIGHT WRIST PAIN: Primary | ICD-10-CM

## 2022-06-09 PROCEDURE — 97110 THERAPEUTIC EXERCISES: CPT

## 2022-06-09 PROCEDURE — 97140 MANUAL THERAPY 1/> REGIONS: CPT

## 2022-06-09 NOTE — PROGRESS NOTES
Daily Note     Today's date: 2022  Patient name: Marybeth Barker  : 1965  MRN: [id number]  Referring provider: Isabel Garcia MD  Dx:   Encounter Diagnosis     ICD-10-CM    1  Right wrist pain  M25 531                   Subjective: "It doesn't hurt all the time, sometimes it just catches and hurts"  "It especially hurts when I lift up from a chair or turn my wrist to place my phone in my pocket"  Objective: See treatment diary below      Assessment: Tolerated treatment well  Patient exhibited good technique with therapeutic exercises and would benefit from continued OT  Patient had MRI prior to session  MRI displayed evidence of inflammation in the ECU and slight inflammation in the carpal ligaments  MRI displayed no sign of fracture  Patient reported no pain symptoms during typical movement but during certain tasks he has significantly increased pain  Patient completed therapeutic exercises and activities on this date, tolerating them well  Therapist performed special tests to determine potential mechanism of pain  Patient is positive for screwdriver test and TFCC load test  Therapist performed E-Stim, ultrasound, and IASTM to decrease edema and pain  Plan: Continue per plan of care  Progress treatment as tolerated         Initial Evaluation completed on: 2022  Re-Evaluation needs to be completed before: 2022  Insurance: AMA  FOTO: 2022  Auth Visits: N/A       Precautions: R wrist pain       Manuals 2022     IASTM   Performed dorsal and volar wrist     Manual Stretch Wrist all planes  performed Performed     KT tape  Ulnar side      Manual massage  performed Peformed     Neuro Re-Ed  2022                                                             Ther Ex 2022     Wrist AROM Extension Radial/Ulnar Deviation HEP       Dart Throw ROM HEP       Thumb AROM all planes HEP       Wrist Maze   X 3 Rubber Band Digit Extension        Digiflex   Green  X 20     Hand Power Pro   Green x 20     Single Tension Pin Thumb        isometrics  X 5 all wrist directions X 5 all wrist directions     Flex Bar Twists  (flex, twist, bottle cap)   X 20  X 20  X 20     Ther Activity 6/01/2022 6/7/2022 6/09/2022                                             Modalities 6/01/2022 6/7/2022 6/09/2022      10' CP/stim performed post tx  w/ E-Stim performed post tx     E-STIM   Performed w/      Ultrasound Performed dorsal wrist 0 8 intensity  3  3MHz  50% rate   8 intensity 3 3 MHz 50% rate

## 2022-06-14 ENCOUNTER — OFFICE VISIT (OUTPATIENT)
Dept: OCCUPATIONAL THERAPY | Facility: CLINIC | Age: 57
End: 2022-06-14
Payer: OTHER MISCELLANEOUS

## 2022-06-14 DIAGNOSIS — M25.531 RIGHT WRIST PAIN: Primary | ICD-10-CM

## 2022-06-14 PROCEDURE — 97014 ELECTRIC STIMULATION THERAPY: CPT

## 2022-06-14 PROCEDURE — 97140 MANUAL THERAPY 1/> REGIONS: CPT

## 2022-06-14 PROCEDURE — 97110 THERAPEUTIC EXERCISES: CPT

## 2022-06-14 PROCEDURE — 97035 APP MDLTY 1+ULTRASOUND EA 15: CPT

## 2022-06-14 NOTE — PROGRESS NOTES
Daily Note     Today's date: 2022  Patient name: Toro Hendrickson  : 1965  MRN: [id number]  Referring provider: Chadd Mayen MD  Dx:   Encounter Diagnosis     ICD-10-CM    1  Right wrist pain  M25 531                   Subjective: "It is about the same "      Objective: See treatment diary below      Assessment: Tolerated treatment well  Patient exhibited good technique with therapeutic exercises and would benefit from continued OT  Patient has follow-up with MD tomorrow  Therapist trialed cupping this date to address localized pain and swelling  Patient presents with wrist brace this date  Patient continues to reports pain with weight bearing and complex wrist movements  Plan: Continue per plan of care  Progress treatment as tolerated         Initial Evaluation completed on: 2022  Re-Evaluation needs to be completed before: 2022  Insurance: [place]  FOTO: 2022  Auth Visits: N/A       Precautions: R wrist pain       Manuals 2022    IASTM   Performed dorsal and volar wrist Performed dorsal and volar wrist    Manual Stretch Wrist all planes  performed Performed Performed    KT tape  Ulnar side      Manual massage  performed Peformed     Cupping    3' Dorsal Wrist            Neuro Re-Ed  2022                                                            Ther Ex 2022    Wrist AROM Extension Radial/Ulnar Deviation HEP       Dart Throw ROM HEP       Thumb AROM all planes HEP       Wrist Maze   X 3 X 3    Rubber Band Digit Extension        Digiflex   Green  X 20 Blue  X 20    Hand Power Pro   Green x 20 Green x 20    Single Tension Pin Thumb        isometrics  X 5 all wrist directions X 5 all wrist directions X 5 all wrist directions    Flex Bar Twists  (flex, twist, bottle cap)   X 20  X 20  X 20 X 20  X 20  X 20            Ther Activity 2022 Modalities 6/01/2022 6/7/2022 6/09/2022 06/14/2022     10' CP/stim performed post tx  w/ E-Stim performed post tx  w/ E-Stim performed post tx    E-STIM   Performed w/  Performed w/     Ultrasound Performed dorsal wrist 0 8 intensity  3  3MHz  50% rate   8 intensity 3 3 MHz 50% rate   8 intensity 3 3 MHz 50% rate

## 2022-06-16 ENCOUNTER — OFFICE VISIT (OUTPATIENT)
Dept: OCCUPATIONAL THERAPY | Facility: CLINIC | Age: 57
End: 2022-06-16
Payer: OTHER MISCELLANEOUS

## 2022-06-16 DIAGNOSIS — M25.531 RIGHT WRIST PAIN: Primary | ICD-10-CM

## 2022-06-16 PROCEDURE — 97035 APP MDLTY 1+ULTRASOUND EA 15: CPT

## 2022-06-16 PROCEDURE — 97140 MANUAL THERAPY 1/> REGIONS: CPT

## 2022-06-16 PROCEDURE — 97110 THERAPEUTIC EXERCISES: CPT

## 2022-06-16 PROCEDURE — 97014 ELECTRIC STIMULATION THERAPY: CPT

## 2022-06-16 NOTE — PROGRESS NOTES
Daily Note     Today's date: 2022  Patient name: Violette Pearza  : 1965  MRN: [id number]  Referring provider: Judi Guillory MD  Dx:   Encounter Diagnosis     ICD-10-CM    1  Right wrist pain  M25 531                   Subjective: "I went back yesterday "      Objective: See treatment diary below      Assessment: Tolerated treatment well  Patient exhibited good technique with therapeutic exercises and would benefit from continued OT  Patient tolerated cupping this date with increased response to treatment as compared to last session  Patient had follow-up with MD yesterday with a referral to hand surgeon at [facility]  Patient has not set-up an appointment at this time  The results from his MRI were discussed at visit  Plan: Continue per plan of care  Progress treatment as tolerated         Initial Evaluation completed on: 2022  Re-Evaluation needs to be completed before: 2022  Insurance: A  FOTO: 2022  Auth Visits: N/A       Precautions: R wrist pain       Manuals 2022   IASTM   Performed dorsal and volar wrist Performed dorsal and volar wrist Performed dorsal and volar wrist   Manual Stretch Wrist all planes  performed Performed Performed Performed   KT tape  Ulnar side      Manual massage  performed Peformed     Cupping    3' Dorsal Wrist 3' Dorsal Wrist           Neuro Re-Ed  2022                                                           Ther Ex 2022   Wrist AROM Extension Radial/Ulnar Deviation HEP    2#  X 20  X 20  X 20   Dart Throw ROM HEP       Thumb AROM all planes HEP       Wrist Maze   X 3 X 3 X 3   Rubber Band Digit Extension        Digiflex   Green  X 20 Blue  X 20 Blue  X 20   Hand Power Pro   Green x 20 Green x 20 Green x 20   Single Tension Pin Thumb        isometrics  X 5 all wrist directions X 5 all wrist directions     Flex Bar Twists  (flex, twist, bottle cap)   Yellow  X 20  X 20  X 20 Yellow  X 20  X 20  X 20 Yellow  X 20  X 20  X 20           Ther Activity 6/01/2022 6/7/2022 6/09/2022 06/14/2022 06/16/2022                                           Modalities 6/01/2022 6/7/2022 6/09/2022 06/14/2022 06/16/2022    10' CP/stim performed post tx  w/ E-Stim performed post tx  w/ E-Stim performed post tx  w/ E-Stim performed post tx   E-STIM   Performed w/  Performed w/  Performed w/    Ultrasound Performed dorsal wrist 0 8 intensity  3  3MHz  50% rate   8 intensity 3 3 MHz 50% rate   8 intensity 3 3 MHz 50% rate   8 intensity 3 3 MHz 50% rate

## 2022-06-21 ENCOUNTER — OFFICE VISIT (OUTPATIENT)
Dept: OCCUPATIONAL THERAPY | Facility: CLINIC | Age: 57
End: 2022-06-21
Payer: OTHER MISCELLANEOUS

## 2022-06-21 DIAGNOSIS — M25.531 RIGHT WRIST PAIN: Primary | ICD-10-CM

## 2022-06-21 PROCEDURE — 97035 APP MDLTY 1+ULTRASOUND EA 15: CPT

## 2022-06-21 PROCEDURE — 97014 ELECTRIC STIMULATION THERAPY: CPT

## 2022-06-21 PROCEDURE — 97110 THERAPEUTIC EXERCISES: CPT

## 2022-06-21 PROCEDURE — 97140 MANUAL THERAPY 1/> REGIONS: CPT

## 2022-06-21 NOTE — PROGRESS NOTES
Daily Note     Today's date: 2022  Patient name: Mariana Parker  : 1965  MRN: [id number]  Referring provider: Jocelyn Gonsales MD  Dx:   Encounter Diagnosis     ICD-10-CM    1  Right wrist pain  M25 531                   Subjective: "It was feeling pretty good "      Objective: See treatment diary below      Assessment: Tolerated treatment well  Patient exhibited good technique with therapeutic exercises and would benefit from continued OT  Patient tolerated session well with additional reps completed without cueing  Patient reports feeling better since last session till he needed to dump out a trash can yesterday  Patient reports a relief of symptoms at the end of session  Plan: Continue per plan of care  Progress treatment as tolerated  Initial Evaluation completed on: 2022  Re-Evaluation needs to be completed before: 2022  Insurance: [place] [employer]  FOTO: 2022  Auth Visits: N/A       Precautions: R wrist pain       Manuals 2022       IASTM Performed dorsal and volar wrist       Manual Stretch Wrist all planes Performed       KT tape        Manual massage        Cupping 3' Dorsal Wrist               Neuro Re-Ed  2022                                                               Ther Ex 2022       Wrist AROM Extension Radial/Ulnar Deviation 2#  X 20  X 20  X 20       Dart Throw ROM        Thumb AROM all planes        Wrist Maze X 3       Rubber Band Digit Extension        Digiflex Blue  X 20       Hand Power Pro Green x 20       Single Tension Pin Thumb        isometrics        Flex Bar Twists  (flex, twist, bottle cap) Yellow  X 20  X 20  X 20               Ther Activity 2022                                               Modalities 2022       [facility] w/ E-Stim performed post tx       E-STIM Performed w/        Ultrasound   8 intensity 3 3 MHz 50% rate

## 2022-06-23 ENCOUNTER — APPOINTMENT (OUTPATIENT)
Dept: OCCUPATIONAL THERAPY | Facility: CLINIC | Age: 57
End: 2022-06-23
Payer: OTHER MISCELLANEOUS

## 2024-09-26 ENCOUNTER — APPOINTMENT (OUTPATIENT)
Dept: URGENT CARE | Age: 59
End: 2024-09-26

## (undated) DEVICE — BAG URINE DRAINAGE LEG

## (undated) DEVICE — GLOVE SRG BIOGEL 7

## (undated) DEVICE — PACK TUR

## (undated) DEVICE — CATH FOLEY 20FR 30ML 2 WAY SILICONE-ELASTIMER

## (undated) DEVICE — BAG URINE DRAINAGE 2000ML ANTI RFLX LF

## (undated) DEVICE — TUBING SUCTION 5MM X 12 FT

## (undated) DEVICE — SCD SEQUENTIAL COMPRESSION COMFORT SLEEVE MEDIUM KNEE LENGTH: Brand: KENDALL SCD

## (undated) DEVICE — UROCATCH BAG